# Patient Record
Sex: FEMALE | Race: OTHER | Employment: OTHER | URBAN - METROPOLITAN AREA
[De-identification: names, ages, dates, MRNs, and addresses within clinical notes are randomized per-mention and may not be internally consistent; named-entity substitution may affect disease eponyms.]

---

## 2017-08-31 PROBLEM — K21.9 GASTROESOPHAGEAL REFLUX DISEASE WITHOUT ESOPHAGITIS: Status: ACTIVE | Noted: 2017-08-31

## 2017-11-28 NOTE — TELEPHONE ENCOUNTER
Terri called in to the infusion. She had a reclast infusion scheduled for January 4th, she found out that she needs dental work done a extraction. Aaliyah Burks said she planned this extraction around maybe January, she is not sure.  I told her they recommend w

## 2018-04-13 PROBLEM — M81.0 OSTEOPOROSIS: Status: ACTIVE | Noted: 2018-04-13

## 2018-05-31 NOTE — PROGRESS NOTES
Patient here for Reclast infusion. Patient [place] speaking, but understands [place]. Ml Molina MA helped to translate. Patient stated she had Root Canal on March 14. States she couldn't remember whether she told MD that she had the dental procedure.

## 2020-05-28 PROBLEM — K21.9 GERD (GASTROESOPHAGEAL REFLUX DISEASE): Status: ACTIVE | Noted: 2017-08-31

## 2020-05-28 PROBLEM — E03.9 HYPOTHYROIDISM: Status: ACTIVE | Noted: 2020-05-28

## 2020-05-28 PROBLEM — IMO0002 COMPRESSION FRACTURE: Status: ACTIVE | Noted: 2020-05-28

## 2020-06-17 PROBLEM — K76.0 FATTY LIVER: Status: ACTIVE | Noted: 2020-06-17

## 2020-08-25 NOTE — PROGRESS NOTES
Patient returns for Reclast infusion. Ten Min arrived ambualtory and without complaints, Patient confirmed she is taking Calcium and Vitamin D. Reclast given as ordered. Flushed with NS then removed. Gauze and coban to site. Discharged ambulatory.   Inst

## 2021-02-23 PROBLEM — K59.00 CONSTIPATION, UNSPECIFIED CONSTIPATION TYPE: Status: ACTIVE | Noted: 2021-02-23

## 2021-08-27 NOTE — PROGRESS NOTES
Unable to administer reclast today due to pt reports tooth/ jaw pain  She has history of \"dark spot and root canal to same tooth\" and has concerns that this may be worsening  She will see her dentist / endodontist for care or OK to proceed first  Informe

## 2021-09-03 NOTE — PROGRESS NOTES
Ambulatory to dept for yearly reclast infusion. Pt brought in note from dentist with clearance to proceed with reclast infusion. Letter scanned into electronic medical record. Pt offers no complaints. Encouraged to remain hydrated for the next 24hrs.   V

## 2022-09-09 NOTE — PROGRESS NOTES
Pt to infusion for yearly Reclast infusion. Arrvies ambulating independently. Labs done at [facility], faxed from MDs office today. Pt denies any recent dental work or jaw pain. PIV to right upper arm - present blood return noted. Reclast given over 20 min, pt appeared to tolerate well. PIV removed, site covered with gauze and coban. Discharged to home ambulating independently.

## 2023-01-25 NOTE — PATIENT INSTRUCTIONS
You had a thyroid nodule which was biopsied about 3 months ago and indeterminant secondary to a paucity of cellularity. I will upload your disc into our system and we can repeat the audiogram in mid March 2 check for stability. If there has been a change you may require a second fine-needle aspirate. Your right jaw pain is secondary to temporomandibular joint syndrome. You were given a handout for this. Heat, soft diet (only foods you can cut with a fork), aspirin, advil, aleve, or motrin. No gum chewing. Consider a dental splint if grinding. You can also consider physical therapy should this not be helpful to you.

## 2023-05-19 ENCOUNTER — HOSPITAL ENCOUNTER (OUTPATIENT)
Dept: ULTRASOUND IMAGING | Age: 63
Discharge: HOME | End: 2023-05-19
Attending: SPECIALIST
Payer: COMMERCIAL

## 2023-05-19 DIAGNOSIS — E04.1 THYROID NODULE GREATER THAN OR EQUAL TO 1 CM IN DIAMETER INCIDENTALLY NOTED ON IMAGING STUDY: ICD-10-CM

## 2023-05-19 PROCEDURE — 76536 US EXAM OF HEAD AND NECK: CPT | Performed by: SPECIALIST

## 2025-02-23 ENCOUNTER — APPOINTMENT (OUTPATIENT)
Dept: ULTRASOUND IMAGING | Facility: HOSPITAL | Age: 65
End: 2025-02-23
Attending: EMERGENCY MEDICINE
Payer: COMMERCIAL

## 2025-02-23 ENCOUNTER — APPOINTMENT (OUTPATIENT)
Dept: CT IMAGING | Facility: HOSPITAL | Age: 65
End: 2025-02-23
Attending: EMERGENCY MEDICINE
Payer: COMMERCIAL

## 2025-02-23 ENCOUNTER — HOSPITAL ENCOUNTER (EMERGENCY)
Facility: HOSPITAL | Age: 65
Discharge: HOME | End: 2025-02-23
Attending: EMERGENCY MEDICINE
Payer: COMMERCIAL

## 2025-02-23 VITALS
WEIGHT: 170 LBS | HEART RATE: 55 BPM | TEMPERATURE: 98 F | BODY MASS INDEX: 30 KG/M2 | RESPIRATION RATE: 18 BRPM | OXYGEN SATURATION: 99 % | SYSTOLIC BLOOD PRESSURE: 126 MMHG | DIASTOLIC BLOOD PRESSURE: 70 MMHG

## 2025-02-23 DIAGNOSIS — R10.13 EPIGASTRIC PAIN: Primary | ICD-10-CM

## 2025-02-23 LAB
ALBUMIN SERPL-MCNC: 4.6 G/DL (ref 3.2–4.8)
ALP LIVER SERPL-CCNC: 60 U/L
ALT SERPL-CCNC: 24 U/L
ANION GAP SERPL CALC-SCNC: 8 MMOL/L (ref 0–18)
AST SERPL-CCNC: 23 U/L (ref ?–34)
ATRIAL RATE: 55 BPM
BASOPHILS # BLD AUTO: 0.03 X10(3) UL (ref 0–0.2)
BASOPHILS NFR BLD AUTO: 0.5 %
BILIRUB DIRECT SERPL-MCNC: 0.1 MG/DL (ref ?–0.3)
BILIRUB SERPL-MCNC: 0.5 MG/DL (ref 0.2–1.1)
BILIRUB UR QL: NEGATIVE
BUN BLD-MCNC: 10 MG/DL (ref 9–23)
BUN/CREAT SERPL: 11.8 (ref 10–20)
CALCIUM BLD-MCNC: 9.1 MG/DL (ref 8.7–10.4)
CHLORIDE SERPL-SCNC: 107 MMOL/L (ref 98–112)
CLARITY UR: CLEAR
CO2 SERPL-SCNC: 27 MMOL/L (ref 21–32)
COLOR UR: COLORLESS
CREAT BLD-MCNC: 0.85 MG/DL
DEPRECATED RDW RBC AUTO: 42 FL (ref 35.1–46.3)
EGFRCR SERPLBLD CKD-EPI 2021: 77 ML/MIN/1.73M2 (ref 60–?)
EOSINOPHIL # BLD AUTO: 0.07 X10(3) UL (ref 0–0.7)
EOSINOPHIL NFR BLD AUTO: 1.3 %
ERYTHROCYTE [DISTWIDTH] IN BLOOD BY AUTOMATED COUNT: 12.4 % (ref 11–15)
GLUCOSE BLD-MCNC: 94 MG/DL (ref 70–99)
GLUCOSE UR-MCNC: NORMAL MG/DL
HCT VFR BLD AUTO: 39.8 %
HGB BLD-MCNC: 13.8 G/DL
HGB UR QL STRIP.AUTO: NEGATIVE
IMM GRANULOCYTES # BLD AUTO: 0.01 X10(3) UL (ref 0–1)
IMM GRANULOCYTES NFR BLD: 0.2 %
KETONES UR-MCNC: NEGATIVE MG/DL
LEUKOCYTE ESTERASE UR QL STRIP.AUTO: NEGATIVE
LIPASE SERPL-CCNC: 29 U/L (ref 12–53)
LYMPHOCYTES # BLD AUTO: 1.94 X10(3) UL (ref 1–4)
LYMPHOCYTES NFR BLD AUTO: 34.9 %
MCH RBC QN AUTO: 31.9 PG (ref 26–34)
MCHC RBC AUTO-ENTMCNC: 34.7 G/DL (ref 31–37)
MCV RBC AUTO: 92.1 FL
MONOCYTES # BLD AUTO: 0.45 X10(3) UL (ref 0.1–1)
MONOCYTES NFR BLD AUTO: 8.1 %
NEUTROPHILS # BLD AUTO: 3.06 X10 (3) UL (ref 1.5–7.7)
NEUTROPHILS # BLD AUTO: 3.06 X10(3) UL (ref 1.5–7.7)
NEUTROPHILS NFR BLD AUTO: 55 %
NITRITE UR QL STRIP.AUTO: NEGATIVE
OSMOLALITY SERPL CALC.SUM OF ELEC: 293 MOSM/KG (ref 275–295)
P AXIS: 66 DEGREES
P-R INTERVAL: 178 MS
PH UR: 7 [PH] (ref 5–8)
PLATELET # BLD AUTO: 230 10(3)UL (ref 150–450)
POTASSIUM SERPL-SCNC: 4.2 MMOL/L (ref 3.5–5.1)
PROT SERPL-MCNC: 7.4 G/DL (ref 5.7–8.2)
PROT UR-MCNC: NEGATIVE MG/DL
Q-T INTERVAL: 422 MS
QRS DURATION: 88 MS
QTC CALCULATION (BEZET): 403 MS
R AXIS: 97 DEGREES
RBC # BLD AUTO: 4.32 X10(6)UL
SODIUM SERPL-SCNC: 142 MMOL/L (ref 136–145)
SP GR UR STRIP: 1.01 (ref 1–1.03)
T AXIS: 50 DEGREES
UROBILINOGEN UR STRIP-ACNC: NORMAL
VENTRICULAR RATE: 55 BPM
WBC # BLD AUTO: 5.6 X10(3) UL (ref 4–11)

## 2025-02-23 PROCEDURE — 80048 BASIC METABOLIC PNL TOTAL CA: CPT

## 2025-02-23 PROCEDURE — 99285 EMERGENCY DEPT VISIT HI MDM: CPT

## 2025-02-23 PROCEDURE — 83690 ASSAY OF LIPASE: CPT | Performed by: EMERGENCY MEDICINE

## 2025-02-23 PROCEDURE — 85025 COMPLETE CBC W/AUTO DIFF WBC: CPT | Performed by: EMERGENCY MEDICINE

## 2025-02-23 PROCEDURE — 80048 BASIC METABOLIC PNL TOTAL CA: CPT | Performed by: EMERGENCY MEDICINE

## 2025-02-23 PROCEDURE — 74177 CT ABD & PELVIS W/CONTRAST: CPT | Performed by: EMERGENCY MEDICINE

## 2025-02-23 PROCEDURE — 81003 URINALYSIS AUTO W/O SCOPE: CPT | Performed by: EMERGENCY MEDICINE

## 2025-02-23 PROCEDURE — 83690 ASSAY OF LIPASE: CPT

## 2025-02-23 PROCEDURE — 80076 HEPATIC FUNCTION PANEL: CPT

## 2025-02-23 PROCEDURE — 99284 EMERGENCY DEPT VISIT MOD MDM: CPT

## 2025-02-23 PROCEDURE — 80076 HEPATIC FUNCTION PANEL: CPT | Performed by: EMERGENCY MEDICINE

## 2025-02-23 PROCEDURE — 96375 TX/PRO/DX INJ NEW DRUG ADDON: CPT

## 2025-02-23 PROCEDURE — 93010 ELECTROCARDIOGRAM REPORT: CPT

## 2025-02-23 PROCEDURE — 96374 THER/PROPH/DIAG INJ IV PUSH: CPT

## 2025-02-23 PROCEDURE — 76705 ECHO EXAM OF ABDOMEN: CPT | Performed by: EMERGENCY MEDICINE

## 2025-02-23 PROCEDURE — 93005 ELECTROCARDIOGRAM TRACING: CPT

## 2025-02-23 PROCEDURE — 85025 COMPLETE CBC W/AUTO DIFF WBC: CPT

## 2025-02-23 RX ORDER — LIDOCAINE HYDROCHLORIDE 20 MG/ML
10 SOLUTION OROPHARYNGEAL ONCE
Status: COMPLETED | OUTPATIENT
Start: 2025-02-23 | End: 2025-02-23

## 2025-02-23 RX ORDER — KETOROLAC TROMETHAMINE 15 MG/ML
15 INJECTION, SOLUTION INTRAMUSCULAR; INTRAVENOUS ONCE
Status: COMPLETED | OUTPATIENT
Start: 2025-02-23 | End: 2025-02-23

## 2025-02-23 RX ORDER — MAGNESIUM HYDROXIDE/ALUMINUM HYDROXICE/SIMETHICONE 120; 1200; 1200 MG/30ML; MG/30ML; MG/30ML
30 SUSPENSION ORAL ONCE
Status: COMPLETED | OUTPATIENT
Start: 2025-02-23 | End: 2025-02-23

## 2025-02-23 RX ORDER — NICOTINE POLACRILEX 4 MG/1
20 GUM, CHEWING ORAL DAILY
Qty: 30 TABLET | Refills: 0 | Status: SHIPPED | OUTPATIENT
Start: 2025-02-23 | End: 2025-03-25

## 2025-02-23 NOTE — ED PROVIDER NOTES
Patient Seen in: [facility] Emergency Department      History     Chief Complaint   Patient presents with    Epigastric Pain     Stated Complaint: Upper abd pain    Subjective:   HPI      Patient is a 64-year-old female that ate some pork last night a few hours later she started to get epigastric right upper quadrant pain.  It is persisted today.  She has not vomited.  No fever or chills.  No lower abdominal pain.  Normal bowel movement no urinary symptoms she states she has had previous pains like this that were more short-lived.    Objective:     History reviewed. No pertinent past medical history.           Past Surgical History:   Procedure Laterality Date    Hysterectomy      9 surgery total for this                Social History     Socioeconomic History    Marital status:    Tobacco Use    Smoking status: Never    Smokeless tobacco: Never                  Physical Exam     ED Triage Vitals [02/23/25 1201]   /86   Pulse 60   Resp 18   Temp 98.3 °F (36.8 °C)   Temp src Temporal   SpO2 99 %   O2 Device None (Room air)       Current Vitals:   Vital Signs  BP: 125/61  Pulse: 54  Resp: 18  Temp: 98.3 °F (36.8 °C)  Temp src: Temporal  MAP (mmHg): 80    Oxygen Therapy  SpO2: 99 %  O2 Device: None (Room air)        Physical Exam  Constitutional: Oriented to person, place, and time. Appears well-developed and well-nourished.   HEENT:   Head: Normocephalic and atraumatic.   Right Ear: External ear normal.   Left Ear: External ear normal.   Nose: Nose normal.   Mouth/Throat: Oropharynx is clear and moist.   Eyes: Conjunctivae and EOM are normal. Pupils are equal, round, and reactive to light.   Neck: Neck supple.   Cardiovascular: Normal rate, regular rhythm, normal heart sounds and intact distal pulses.    Pulmonary/Chest: Effort normal and breath sounds normal. No respiratory distress.   Abdominal: Soft. Bowel sounds are normal. Exhibits no distension and no mass. There is epigastric and right  upper tenderness. There is no rebound and no guarding.   Musculoskeletal: Normal range of motion. Exhibits no edema or tenderness.   Lymphadenopathy: No cervical adenopathy.   Neurological: Alert and oriented to person, place, and time. Normal reflexes. No cranial nerve deficit. No motor os sensory defecits noted Coordination normal.   Skin: Skin is warm and dry.   Psychiatric: Normal mood and affect. Behavior is normal. Judgment and thought content normal.   Nursing note and vitals reviewed.      ED Course     Labs Reviewed   URINALYSIS WITH CULTURE REFLEX - Abnormal; Notable for the following components:       Result Value    Urine Color Colorless (*)     All other components within normal limits   BASIC METABOLIC PANEL (8) - Normal   HEPATIC FUNCTION PANEL (7) - Normal   LIPASE - Normal   CBC WITH DIFFERENTIAL WITH PLATELET   RAINBOW DRAW BLUE     EKG    Rate, intervals and axes as noted on EKG Report.  Rate: 55  Rhythm: Sinus Rhythm  Reading: Sinus bradycardia otherwise normal                       MDM      Use of independent historian:  at bedside helps with history describes her dinner last night    I personally reviewed and interpreted the images :     CT ABDOMEN+PELVIS(CONTRAST ONLY)(CPT=74177)    Result Date: 2/23/2025  CONCLUSION:   Diffuse hepatic steatosis.  Tiny scattered low-density lesions of the liver with the largest being a 1.7 cm fluid density lesion at the hepatic dome, statistically most likely a hepatic cyst or hemangioma.  Recommend follow-up with right upper quadrant ultrasound on an outpatient basis.  Moderate compression fracture of the L1 vertebral body with minimal retropulsion, which demonstrate imaging features suggesting a chronic etiology.  Correlate with symptoms/history.  Small hiatal hernia.          Dictated by (CST): Darby Gallardo MD on 2/23/2025 at 4:45 PM     Finalized by (CST): Darby Gallardo MD on 2/23/2025 at 4:49 PM          US GALLBLADDER (CPT=76705)    Result  Date: 2/23/2025  CONCLUSION:   Unremarkable gallbladder.  No biliary ductal dilation.      Dictated by (CST): Darby Gallardo MD on 2/23/2025 at 3:25 PM     Finalized by (CST): Darby Gallardo MD on 2/23/2025 at 3:25 PM           Vitals:    02/23/25 1201 02/23/25 1345 02/23/25 1545   BP: 147/86 127/78 125/61   Pulse: 60 52 54   Resp: 18     Temp: 98.3 °F (36.8 °C)     TempSrc: Temporal     SpO2: 99%     Weight: 77.1 kg       *I personally reviewed and interpreted all ED vitals.    Pulse Ox: 99%, Room air, Normal     EKG interpretation above independently interpreted by me    Monitor Interpretation:   sinus bradycardia independently interpreted by me    Differential Diagnosis/ Diagnostic Considerations: Epigastric right upper quadrant pain consider gastritis consider GE reflux consider biliary colic consider pancreatitis    Medical Record Review: I personally reviewed available prior medical records for any recent pertinent discharge summaries, testing, and procedures and reviewed those reports and found notes Dr. William Umana famotidine prescription.    Complicating Factors: The patient already has GE reflux and thyroiditis which contribute to the complexity of this ED evaluation.    Social determinants of health:    Prescription drug management:      Shared Decision Making:    ED Course: Over the course of the patient's stay she states that her epigastric pain improved and now with a small amount of burning sensation repeat zenaida.  She was given Maalox and viscous lidocaine and now states her symptoms are much better will discharge home after unremarkable workup    Discussion of management with other healthcare providers:    Condition upon leaving the department: Stable          Medical Decision Making      Disposition and Plan     Clinical Impression:  1. Epigastric pain         Disposition:  Discharge  2/23/2025  5:08 pm    Follow-up:  Claudia Lozano  [facility]  [address] [place] [address]  [id number]  [phone]    Follow up in 3 day(s)            Medications Prescribed:  Current Discharge Medication List        START taking these medications    Details   Omeprazole 20 MG Oral Tab EC Take 20 mg by mouth daily.  Qty: 30 tablet, Refills: 0                 Supplementary Documentation:

## 2025-02-23 NOTE — ED INITIAL ASSESSMENT (HPI)
Patient complains of intermittent epigastric pain since last night, severe at times. Hx of gerd. Unrelieved by pepcid. Denies nvd

## 2025-02-23 NOTE — ED QUICK NOTES
Patient cleared for discharge by ED MD. Patient verbalizes understanding of discharge instructions. Patient ambulatory upon discharge.

## (undated) NOTE — MR AVS SNAPSHOT
After Visit Summary   8/27/2021    Vale Chappell Pay    MRN: [id number]           Visit Information     Date & Time  8/27/2021  1:30 PM Provider  EM [address] [place] [address] - Infusion Dept.  Phone  [phone] with a [facility] Physician or MARCELLO online. The physician will respond and provide   a treatment plan within a few hours.  ONLINE VISIT  Primary Care Providers  Treatment for mild illness or injury that does not require immediate attention VIDEO VISITS  Average cos